# Patient Record
Sex: FEMALE | Race: WHITE | Employment: FULL TIME | URBAN - NONMETROPOLITAN AREA
[De-identification: names, ages, dates, MRNs, and addresses within clinical notes are randomized per-mention and may not be internally consistent; named-entity substitution may affect disease eponyms.]

---

## 2021-08-03 ENCOUNTER — HOSPITAL ENCOUNTER (OUTPATIENT)
Dept: PHYSICAL THERAPY | Age: 51
Setting detail: THERAPIES SERIES
End: 2021-08-03
Payer: COMMERCIAL

## 2021-08-05 ENCOUNTER — HOSPITAL ENCOUNTER (OUTPATIENT)
Dept: PHYSICAL THERAPY | Age: 51
Setting detail: THERAPIES SERIES
Discharge: HOME | End: 2021-08-05
Payer: COMMERCIAL

## 2021-08-05 PROCEDURE — 97750 PHYSICAL PERFORMANCE TEST: CPT

## 2021-08-05 NOTE — PROGRESS NOTES
[address] [address] [address]  [place]  [phone]      Patient Name: Emilio Weston   : 1970  SSN:   Physician: Arnaldo Gurrola CNP    Client arrived in clinic for completion of functional capacity evaluation. Please refer to evaluation in chart for details. Thank you for allowing me to assist in the care of this patient.      Claudia Vivas, PT #[id number] 2021 2:22 PM     Time in 1245  Time out: 1415  Total time: 90 minutes

## 2021-08-05 NOTE — DISCHARGE SUMMARY
[address] [address] [address]  [place]  [phone]      Patient Name: Jas Butterfield   : 1970  SSN:   Physician: Janeth Peter CNP    Client arrived in clinic for completion of functional capacity evaluation. Please refer to evaluation in chart for details. Client is discharged at this time. Thank you for allowing me to assist in the care of this patient.      Bhavana Vega, PT EI#[id number] 2021 2:23 PM

## 2022-04-18 ENCOUNTER — OFFICE VISIT (OUTPATIENT)
Dept: PRIMARY CARE CLINIC | Age: 52
End: 2022-04-18

## 2022-04-18 DIAGNOSIS — M47.22 CERVICAL SPONDYLOSIS WITH RADICULOPATHY: ICD-10-CM

## 2022-04-18 DIAGNOSIS — M50.020 CERVICAL DISC DISORDER WITH MYELOPATHY OF MID-CERVICAL REGION: ICD-10-CM

## 2022-04-18 DIAGNOSIS — Z76.89 RETURN TO WORK EVALUATION: Primary | ICD-10-CM

## 2022-04-18 DIAGNOSIS — M50.21 DISPLACEMENT OF INTERVERTEBRAL DISC OF HIGH CERVICAL REGION: ICD-10-CM

## 2022-04-18 PROBLEM — M54.2 CERVICALGIA: Status: ACTIVE | Noted: 2020-10-09

## 2022-04-18 NOTE — LETTER
[address] [facility] [address] [facility]  Phone: [phone]  Fax: [phone]    PALAK Eddy CNP        April 18, 2022     Patient: Marilu Viera   YOB: 1970   Date of Visit: 4/18/2022       To Whom It May Concern: It is my medical opinion that Marilu Viera RTW 4/19/2022. following restrictions are permanent:  Right Upper extremity: no overhead reaching or overhead lifting with the Right arm/upper extremity  Using Right arm he is able to curl up to 10# using elbow to lift to shoulder height  May use LUE to lift 15# overhead, 20# floor to waist height. He may occasionally perform motions that require reaching up over shoulder and frequently can reach out with LUE. Push pull 50#  Occasional kneel and climbing and squat allowed: may perform stairs or ladder climb as directed  No crawling allowed. If you have any questions or concerns, please don't hesitate to call.     Sincerely,        PALAK Eddy CNP

## 2022-04-18 NOTE — Clinical Note
[address] [facility] [address] [facility]  Phone: [phone]  Fax: [phone]    PALAK Alanis CNP        April 18, 2022     Patient: Casie Washington   YOB: 1970   Date of Visit: 4/18/2022       To Whom It May Concern: It is my medical opinion that Casie Washington {Work release (duty restriction):18832}. If you have any questions or concerns, please don't hesitate to call.     Sincerely,        PALAK Alanis CNP

## 2022-04-18 NOTE — PROGRESS NOTES
[place]  [address] [facility],[address] [facility]  Dept: [phone]  Loc: [phone]    Samara Forbes (:  1970) is a 47 y.o. female,Established patient, here for evaluation of the following chief complaint(s): Other (return to work physical)      ASSESSMENT/PLAN:  1. Return to work evaluation  2. 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 Cervical spondylosis with radiculopathy  4. Displacement of intervertebral disc of high cervical region  PT has the following restrictions which are permanent from a prior medical condition    Right Upper extremity: no overhead reaching or overhead lifting with the Right arm/upper extremity  Using Right arm he is able to curl up to 10# using elbow to lift to shoulder height  May use LUE to lift 15# overhead, 20# floor to waist height. He may occasionally perform motions that require reaching up over shoulder and frequently can reach out with LUE. Push pull 50#  Occasional kneel and climbing and squat allowed: may perform stairs or ladder climb as directed  No crawling allowed    He may  RTW and has no limitations from his current procedure. Hnd was advised if he has any issues to report to [employer]/supervisor. Agreeable to POC    Return if symptoms worsen or fail to improve. SUBJECTIVE/OBJECTIVE:  Olesya Calhoun is coming in for a RTW physical      Review of Systems   Constitutional: Negative. HENT: Negative. Respiratory: Negative for chest tightness and shortness of breath. Cardiovascular: Negative for chest pain, palpitations and leg swelling. Musculoskeletal: Positive for arthralgias and myalgias. Negative for gait problem, neck pain and neck stiffness. Chronic myalgia and arthralgias   Neurological: Negative for dizziness, weakness and light-headedness. Psychiatric/Behavioral: Negative. Physical Exam  Vitals reviewed. Constitutional:       Appearance: Normal appearance. She is not ill-appearing. HENT:      Head: Normocephalic. Nose: Nose normal.      Mouth/Throat:      Mouth: Mucous membranes are moist.   Eyes:      Extraocular Movements: Extraocular movements intact. Pupils: Pupils are equal, round, and reactive to light. Cardiovascular:      Rate and Rhythm: Normal rate and regular rhythm. Pulses: Normal pulses. Heart sounds: Normal heart sounds. Pulmonary:      Effort: Pulmonary effort is normal.      Breath sounds: Normal breath sounds. Abdominal:      Palpations: Abdomen is soft. Musculoskeletal:         General: Normal range of motion. Cervical back: Normal range of motion. Comments: Limitations chronically to RUE   Skin:     General: Skin is warm and dry. Capillary Refill: Capillary refill takes less than 2 seconds. Neurological:      Mental Status: She is alert and oriented to person, place, and time. Motor: Motor function is intact. Coordination: Romberg sign negative. Psychiatric:         Mood and Affect: Mood normal.         Behavior: Behavior normal.           On this date 4/18/2022 I have spent 40 minutes reviewing previous notes, test results and face to face with the patient discussing the diagnosis and importance of compliance with the treatment plan as well as documenting on the day of the visit. Previous imaging: plating C3-C4  Progress notes from [facility]    Additional Information:    There were no vitals taken for this visit. An electronic signature was used to authenticate this note.     --PALAK Boyer - CNP

## 2022-04-27 ASSESSMENT — ENCOUNTER SYMPTOMS
CHEST TIGHTNESS: 0
SHORTNESS OF BREATH: 0

## 2022-11-15 ENCOUNTER — PROCEDURE VISIT (OUTPATIENT)
Dept: NEUROLOGY | Age: 52
End: 2022-11-15
Payer: COMMERCIAL

## 2022-11-15 DIAGNOSIS — M25.511 CHRONIC RIGHT SHOULDER PAIN: ICD-10-CM

## 2022-11-15 DIAGNOSIS — G89.29 CHRONIC RIGHT SHOULDER PAIN: ICD-10-CM

## 2022-11-15 DIAGNOSIS — G56.01 RIGHT CARPAL TUNNEL SYNDROME: ICD-10-CM

## 2022-11-15 DIAGNOSIS — M79.601 RIGHT ARM PAIN: Primary | ICD-10-CM

## 2022-11-15 DIAGNOSIS — M54.12 CERVICAL RADICULOPATHY: ICD-10-CM

## 2022-11-15 DIAGNOSIS — M54.2 NECK PAIN: ICD-10-CM

## 2022-11-15 PROCEDURE — 95886 MUSC TEST DONE W/N TEST COMP: CPT | Performed by: PSYCHIATRY & NEUROLOGY

## 2022-11-15 PROCEDURE — 95909 NRV CNDJ TST 5-6 STUDIES: CPT | Performed by: PSYCHIATRY & NEUROLOGY

## 2022-11-30 ENCOUNTER — TELEPHONE (OUTPATIENT)
Dept: NEUROLOGY | Age: 52
End: 2022-11-30

## 2022-11-30 NOTE — TELEPHONE ENCOUNTER
Dr. Balwinder Joe office is requesting the EMG results for this patient    Fax # [phone] attn: [facility]  Office # [phone]